# Patient Record
Sex: MALE | Race: OTHER | Employment: OTHER | URBAN - METROPOLITAN AREA
[De-identification: names, ages, dates, MRNs, and addresses within clinical notes are randomized per-mention and may not be internally consistent; named-entity substitution may affect disease eponyms.]

---

## 2017-01-09 ENCOUNTER — TELEPHONE (OUTPATIENT)
Dept: NEPHROLOGY | Facility: CLINIC | Age: 32
End: 2017-01-09

## 2018-05-25 ENCOUNTER — EXTERNAL LAB (OUTPATIENT)
Dept: OTHER | Age: 33
End: 2018-05-25

## 2018-05-25 LAB — LAB RESULT: NORMAL

## 2018-05-26 ENCOUNTER — APPOINTMENT (OUTPATIENT)
Dept: GENERAL RADIOLOGY | Facility: HOSPITAL | Age: 33
DRG: 674 | End: 2018-05-26
Attending: EMERGENCY MEDICINE

## 2018-05-26 ENCOUNTER — APPOINTMENT (OUTPATIENT)
Dept: GENERAL RADIOLOGY | Facility: HOSPITAL | Age: 33
DRG: 674 | End: 2018-05-26
Attending: RADIOLOGY

## 2018-05-26 ENCOUNTER — APPOINTMENT (OUTPATIENT)
Dept: INTERVENTIONAL RADIOLOGY/VASCULAR | Facility: HOSPITAL | Age: 33
DRG: 674 | End: 2018-05-26
Attending: INTERNAL MEDICINE

## 2018-05-26 ENCOUNTER — HOSPITAL ENCOUNTER (INPATIENT)
Facility: HOSPITAL | Age: 33
LOS: 4 days | Discharge: HOME | DRG: 674 | End: 2018-05-30
Attending: EMERGENCY MEDICINE | Admitting: HOSPITALIST

## 2018-05-26 ENCOUNTER — APPOINTMENT (OUTPATIENT)
Dept: ULTRASOUND IMAGING | Facility: HOSPITAL | Age: 33
DRG: 674 | End: 2018-05-26
Attending: INTERNAL MEDICINE

## 2018-05-26 DIAGNOSIS — N18.5 ACUTE RENAL FAILURE SUPERIMPOSED ON STAGE 5 CHRONIC KIDNEY DISEASE, NOT ON CHRONIC DIALYSIS, UNSPECIFIED ACUTE RENAL FAILURE TYPE (HCC): Primary | ICD-10-CM

## 2018-05-26 DIAGNOSIS — D64.9 ANEMIA: ICD-10-CM

## 2018-05-26 DIAGNOSIS — D64.9 ANEMIA, UNSPECIFIED TYPE: ICD-10-CM

## 2018-05-26 DIAGNOSIS — K62.5 RECTAL BLEEDING: ICD-10-CM

## 2018-05-26 DIAGNOSIS — K92.1 HEMATOCHEZIA: ICD-10-CM

## 2018-05-26 DIAGNOSIS — N17.9 ACUTE RENAL FAILURE SUPERIMPOSED ON STAGE 5 CHRONIC KIDNEY DISEASE, NOT ON CHRONIC DIALYSIS, UNSPECIFIED ACUTE RENAL FAILURE TYPE (HCC): Primary | ICD-10-CM

## 2018-05-26 DIAGNOSIS — R11.0 NAUSEA: ICD-10-CM

## 2018-05-26 PROBLEM — E87.1 HYPONATREMIA: Status: ACTIVE | Noted: 2018-05-26

## 2018-05-26 PROBLEM — E87.2 METABOLIC ACIDOSIS: Status: ACTIVE | Noted: 2018-05-26

## 2018-05-26 PROCEDURE — 02HV33Z INSERTION OF INFUSION DEVICE INTO SUPERIOR VENA CAVA, PERCUTANEOUS APPROACH: ICD-10-PCS | Performed by: RADIOLOGY

## 2018-05-26 PROCEDURE — 71045 X-RAY EXAM CHEST 1 VIEW: CPT | Performed by: EMERGENCY MEDICINE

## 2018-05-26 PROCEDURE — B543ZZA ULTRASONOGRAPHY OF RIGHT JUGULAR VEINS, GUIDANCE: ICD-10-PCS | Performed by: RADIOLOGY

## 2018-05-26 PROCEDURE — 5A1D70Z PERFORMANCE OF URINARY FILTRATION, INTERMITTENT, LESS THAN 6 HOURS PER DAY: ICD-10-PCS | Performed by: INTERNAL MEDICINE

## 2018-05-26 PROCEDURE — 30233N1 TRANSFUSION OF NONAUTOLOGOUS RED BLOOD CELLS INTO PERIPHERAL VEIN, PERCUTANEOUS APPROACH: ICD-10-PCS | Performed by: HOSPITALIST

## 2018-05-26 PROCEDURE — 71045 X-RAY EXAM CHEST 1 VIEW: CPT | Performed by: RADIOLOGY

## 2018-05-26 PROCEDURE — 99223 1ST HOSP IP/OBS HIGH 75: CPT | Performed by: INTERNAL MEDICINE

## 2018-05-26 PROCEDURE — 76770 US EXAM ABDO BACK WALL COMP: CPT | Performed by: INTERNAL MEDICINE

## 2018-05-26 PROCEDURE — 99223 1ST HOSP IP/OBS HIGH 75: CPT | Performed by: HOSPITALIST

## 2018-05-26 RX ORDER — ACETAMINOPHEN 325 MG/1
650 TABLET ORAL EVERY 6 HOURS PRN
Status: DISCONTINUED | OUTPATIENT
Start: 2018-05-26 | End: 2018-05-30

## 2018-05-26 RX ORDER — HEPARIN SODIUM 5000 [USP'U]/ML
INJECTION, SOLUTION INTRAVENOUS; SUBCUTANEOUS
Status: COMPLETED
Start: 2018-05-26 | End: 2018-05-26

## 2018-05-26 RX ORDER — METOCLOPRAMIDE HYDROCHLORIDE 5 MG/ML
5 INJECTION INTRAMUSCULAR; INTRAVENOUS EVERY 8 HOURS PRN
Status: DISCONTINUED | OUTPATIENT
Start: 2018-05-26 | End: 2018-05-30

## 2018-05-26 RX ORDER — LIDOCAINE HYDROCHLORIDE 10 MG/ML
INJECTION, SOLUTION EPIDURAL; INFILTRATION; INTRACAUDAL; PERINEURAL
Status: COMPLETED
Start: 2018-05-26 | End: 2018-05-26

## 2018-05-26 RX ORDER — ONDANSETRON 2 MG/ML
4 INJECTION INTRAMUSCULAR; INTRAVENOUS EVERY 6 HOURS PRN
Status: DISCONTINUED | OUTPATIENT
Start: 2018-05-26 | End: 2018-05-30

## 2018-05-26 RX ORDER — AMLODIPINE BESYLATE 5 MG/1
5 TABLET ORAL DAILY
Status: DISCONTINUED | OUTPATIENT
Start: 2018-05-26 | End: 2018-05-30

## 2018-05-26 NOTE — CONSULTS
[facility]  Report of Consultation    Stephanie Staggers Patient Status:  Emergency    4/15/1985 MRN [id number]   Location [address] Attending Shane Powell MD   [facility] Day # 0 PCP Deangelo Cabrajal.  Riley Hinds MD     Reason fo conversational dyspnea  HEENT: Moist mucous membranes. EOM-I. PERRL  Neck: No lymphadenopathy. No JVD. No carotid bruits. Respiratory: Clear to auscultation bilaterally. No wheezes. No rhonchi. Cardiovascular: S1, S2.  Regular rate and rhythm.   No murm transplantation consideration  2. Symptomatic anemia- currently being transfused; monitor  - will check iron stores; AVRIL w/ HD  - check stool for occult blood; no evidence of hemolysis  3. Hyponatremia  4.  Severe acidosis related to untreated #1 - correct

## 2018-05-26 NOTE — PROCEDURES
[facility]  Pre-Procedure Note    Name: Brayan Mao  MRN#: [id number]  : 4/15/1985    Procedure:  Ultrasound and Fluoro Temporary Dialysis Catheter Placement    Indication: Acute Kidney Injury  Chronic Kidney Disease requiring HD    Allerg

## 2018-05-26 NOTE — ED INITIAL ASSESSMENT (HPI)
Pt presents to ED with complaint of low hemoglobin. Pt reports he has had rectal bleeding x2 months. Reports he was seen by his doctor this week and had labs drawn yesterday.  Reports he received call this morning instructing him to come to ED for low hemog

## 2018-05-26 NOTE — PROGRESS NOTES
Pharmacy Note: Renal dose adjustment for Metaclopramide (Reglan)  Rosario Gloss has been prescribed Metoclopramide (Reglan) 10 mg every 8 hours as needed. Estimated Creatinine Clearance: 4.7 mL/min (A) (based on SCr of 20.8 mg/dL (H)).     His ca

## 2018-05-26 NOTE — ED NOTES
Consent for blood transfusion attached to chart. Family at bedside to interpret. All questions answered pt and family updated on plan.

## 2018-05-26 NOTE — CONSULTS
Gastroenterology Initial Consultation  I have personally seen and examined the patient.     Patient Name: Brinda Person  Referring physician: Dr. Betty Thompson  Reason for consultation: Rectal bleeding, anemia  CC: Rectal bleeding, anemia  HPI: This is history of CAD, prior MI, chest pain, or palpitations            Respiratory: No shortness of breath, asthma, copd, recurrent pneumonia            Hematologic: The patient reports no easy bruising, frequent gum bleeding or nose bleeding;   The patient has n patient request.    Labs:   Lab Results  Component Value Date   WBC 7.3 05/26/2018   HGB 5.9 05/26/2018   HCT 16.8 05/26/2018   .0 05/26/2018   CREATSERUM 20.80 05/26/2018    05/26/2018    05/26/2018   K 4.1 05/26/2018   CL 90 05/26/201 adequately stabilized. He does warrant colonoscopy for this evaluation. We will also check pre-transfusion iron levels. If iron deficient, then he would also warrant EGD with gastric / duodenal biopsies.

## 2018-05-26 NOTE — ED PROVIDER NOTES
Patient Seen in: [facility] Emergency Department    History   Patient presents with:  Abnormal Result (metabolic, cardiac)    Stated Complaint:     HPI    Patient presents with a low hemoglobin. The patient has had rectal bleeding for months.   It w light, oropharynx clear, uvula midline. Neck: Supple. Cardiovascular: Regular rate and rhythm, no murmurs. Respiratory: Lungs clear to auscultation. Abdomen: Soft, nontender, no rebound or guarding, normal active bowel sounds, no CVA tenderness.   Recta WITH DIFFERENTIAL WITH PLATELET.   Procedure                               Abnormality         Status                     ---------                               -----------         ------                     CBC W/ DIFFERENTIAL[[id number]]          Abnormal consulted and came to see him in the emergency department. He does not appear to be actively bleeding at this time. Dr. Barbara Hi from nephrology was consulted when his chemistries came back.   He came to the emergency department to discuss with the patient

## 2018-05-26 NOTE — PLAN OF CARE
Problem: Patient/Family Goals  Goal: Patient/Family Short Term Goal  Patient's Short Term Goal: 5/26-resolve acute issues    Interventions:   - 5/26-stat dialysis  - See additional Care Plan goals for specific interventions   Outcome: Progressing

## 2018-05-26 NOTE — H&P
[facility]  History and Physical     Stephanie Staggers Patient Status:  Emergency    4/15/1985 MRN [id number]   Location [address] Attending Shane Powell MD   [facility] Day # 0 CORTNEY Carbajal.  Riley Hinds MD     Chief distress. Alert and oriented x 3. HEENT: Normocephalic atraumatic. Moist mucous membranes. EOM-I. PERRLA. Anicteric. Neck: No lymphadenopathy. No JVD. No carotid bruits. Respiratory: Clear to auscultation bilaterally. No wheezes. No rhonchi.   Cardiovasc MD  5/26/2018

## 2018-05-26 NOTE — CM/SW NOTE
Received order to assist with new dialysis. Met with pt and wife and several other family members- sister in law interpreted since pt and wife are [place] speaking.   Pt used to have [facility] and was told he was being switched to [place] but th

## 2018-05-26 NOTE — PROCEDURES
[address] [facility] Patient Status:  Emergency    4/15/1985 MRN [id number]   Location [facility] Attending Clif Kincaid MD   [facility] Day # 0 PCP Kush Kearney.  Dami Ayers MD         Brief Procedure Report    Pr

## 2018-05-26 NOTE — PLAN OF CARE
Problem: Patient/Family Goals  Goal: Patient/Family Long Term Goal  Patient's Long Term Goal: 5/26-resolve acute issues    Interventions:  - 5/26-stat dialysis  - See additional Care Plan goals for specific interventions   Outcome: Progressing

## 2018-05-26 NOTE — PROGRESS NOTES
s/p dialysis. tolerated.hgb is 6.7.1 more Units of prbc.verified with hospitalist and nephrologist.order carried out. will endorse to next RN

## 2018-05-27 PROCEDURE — 90935 HEMODIALYSIS ONE EVALUATION: CPT | Performed by: INTERNAL MEDICINE

## 2018-05-27 PROCEDURE — 99232 SBSQ HOSP IP/OBS MODERATE 35: CPT | Performed by: HOSPITALIST

## 2018-05-27 RX ORDER — CALCIUM CARBONATE 200(500)MG
1000 TABLET,CHEWABLE ORAL
Status: DISCONTINUED | OUTPATIENT
Start: 2018-05-27 | End: 2018-05-30

## 2018-05-27 RX ORDER — LISINOPRIL 20 MG/1
20 TABLET ORAL DAILY
Status: DISCONTINUED | OUTPATIENT
Start: 2018-05-27 | End: 2018-05-29

## 2018-05-27 NOTE — PLAN OF CARE
Problem: Patient/Family Goals  Goal: Patient/Family Short Term Goal  Patient's Short Term Goal: 5/26-resolve acute issues  5/26 nocs: no more bleeding from rectum  5/27-resolve anemia  Interventions:   5/27-monitor hgb  - 5/26-stat dialysis  - See addition

## 2018-05-27 NOTE — PROGRESS NOTES
[facility]  Progress Note     Lamonte Ellis Patient Status:  Inpatient    4/15/1985 MRN [id number]   [facility] 5NW-A Attending Waqas Stephenson MD   [facility] Day # 1 PCP Mary Stinson MD     Chief Complaint: GIB    S: Patient units  3. GIB:  Eventual colonoscopy per GI  4. Hyponatremia:  Improved  5. Metabolic Acidosis:  Improved  6.  Mild hypokalemia:  Acceptable with renal function    Plan of care: As above    Quality:  · DVT Prophylaxis: SCD's  · CODE status: Full Code  · Lamin Guzman

## 2018-05-27 NOTE — PROGRESS NOTES
Gastroenterology Progress Note  Patient Name: Juancarlos De La Garza  Chief Complaint: Hematochezia, anemia  S: The patient reports only a little bright red blood per rectum this morning. He has had nausea today, but better overall, since HD yesterday.   No

## 2018-05-27 NOTE — PROGRESS NOTES
[facility]  Progress Note    Lamonte Ellis Patient Status:  Emergency    4/15/1985 MRN [id number]   Location [address] [facility] [address] Attending Servando Sahu MD   [facility] Day # 1 PCP Mary Stinson MD     No acute issues  S clearly uremic with associated lab anormalities  Feels much better after transfusion/HD  - 2nd HD today  - exchange temporary HD cath to [facility] tomorrow if possible  - pt will be referred to transplant center for transplantation consideration  - social

## 2018-05-28 ENCOUNTER — ANESTHESIA EVENT (OUTPATIENT)
Dept: ENDOSCOPY | Facility: HOSPITAL | Age: 33
DRG: 674 | End: 2018-05-28

## 2018-05-28 PROCEDURE — 99232 SBSQ HOSP IP/OBS MODERATE 35: CPT | Performed by: INTERNAL MEDICINE

## 2018-05-28 PROCEDURE — 99232 SBSQ HOSP IP/OBS MODERATE 35: CPT | Performed by: HOSPITALIST

## 2018-05-28 NOTE — PROGRESS NOTES
[facility]  Progress Note     Nettie Ruby Patient Status:  Inpatient    4/15/1985 MRN [id number]   [facility] 5NW-A Attending Jennifer Plummer MD   [address] Day # 2 PCP Alecia Pittman.  Mary Izquierdo MD     Chief Complaint: GIB    S: Patient Epic.    Medications:   • polyethylene glycol  4,000 mL Oral Once   • Calcium Carbonate Antacid  1,000 mg Oral TID AC   • lisinopril  20 mg Oral Daily   • AmLODIPine Besylate  5 mg Oral Daily       ASSESSMENT / PLAN:     1.  ESRD on HD:  Await outpatient HD

## 2018-05-28 NOTE — ANESTHESIA PREPROCEDURE EVALUATION
PRE-OP EVALUATION    Patient Name: Gerardo Temple    Pre-op Diagnosis: Hematochezia [K92.1]  Nausea [R11.0]  Anemia [D64.9]    Procedure(s):  Esophagogastroduodenoscopy, Colonoscopy      Surgeon(s) and Role:     * Carter Lee, DO - Primary    P Results  Component Value Date   WBC 7.0 05/28/2018   RBC 2.65 (L) 05/28/2018   HGB 7.9 (L) 05/28/2018   HCT 23.2 (L) 05/28/2018   MCV 87.5 05/28/2018   MCH 29.8 05/28/2018   MCHC 34.1 05/28/2018   RDW 12.6 05/28/2018   .0 05/28/2018       Lab Result

## 2018-05-28 NOTE — PROGRESS NOTES
[facility]  Progress Note    Kvng Mckay Patient Status:  Emergency    4/15/1985 MRN [id number]   Location [address] Attending Edouard Jackson MD   [facility] Day [facility] PCP Glenny Groves.  Ye Reese MD     No acute issues 13.10* 8.78*   CA 5.6* 6.2* 6.8*   MG  --  2.0 2.1         Recent Labs   05/26/18  0659   ALT 18   AST 5*   ALB 3.3*         Imaging:  Reviewed    Impression:  1.  ESRD - progressive disease; unclear etiology - suspect HTN; possible underlying GN not ruled

## 2018-05-28 NOTE — PROGRESS NOTES
Gastroenterology Progress Note  Patient Name: Lilia Lea  Chief Complaint: Hematochezia, anemia, nausea  S: The patient repots no further hematochezia since yesterday. He has continued mild nausea, which is improving.   Overall, since having sta

## 2018-05-29 ENCOUNTER — ANESTHESIA (OUTPATIENT)
Dept: ENDOSCOPY | Facility: HOSPITAL | Age: 33
DRG: 674 | End: 2018-05-29

## 2018-05-29 ENCOUNTER — APPOINTMENT (OUTPATIENT)
Dept: MRI IMAGING | Facility: HOSPITAL | Age: 33
DRG: 674 | End: 2018-05-29
Attending: NURSE PRACTITIONER

## 2018-05-29 ENCOUNTER — SURGERY (OUTPATIENT)
Age: 33
End: 2018-05-29

## 2018-05-29 PROCEDURE — 99232 SBSQ HOSP IP/OBS MODERATE 35: CPT | Performed by: HOSPITALIST

## 2018-05-29 PROCEDURE — 0DBK8ZX EXCISION OF ASCENDING COLON, VIA NATURAL OR ARTIFICIAL OPENING ENDOSCOPIC, DIAGNOSTIC: ICD-10-PCS | Performed by: INTERNAL MEDICINE

## 2018-05-29 PROCEDURE — 74183 MRI ABD W/O CNTR FLWD CNTR: CPT | Performed by: NURSE PRACTITIONER

## 2018-05-29 PROCEDURE — 0DB68ZX EXCISION OF STOMACH, VIA NATURAL OR ARTIFICIAL OPENING ENDOSCOPIC, DIAGNOSTIC: ICD-10-PCS | Performed by: INTERNAL MEDICINE

## 2018-05-29 PROCEDURE — 90935 HEMODIALYSIS ONE EVALUATION: CPT | Performed by: INTERNAL MEDICINE

## 2018-05-29 PROCEDURE — 0DB98ZX EXCISION OF DUODENUM, VIA NATURAL OR ARTIFICIAL OPENING ENDOSCOPIC, DIAGNOSTIC: ICD-10-PCS | Performed by: INTERNAL MEDICINE

## 2018-05-29 PROCEDURE — 72197 MRI PELVIS W/O & W/DYE: CPT | Performed by: NURSE PRACTITIONER

## 2018-05-29 RX ORDER — LISINOPRIL 40 MG/1
40 TABLET ORAL DAILY
Status: DISCONTINUED | OUTPATIENT
Start: 2018-05-29 | End: 2018-05-30

## 2018-05-29 NOTE — PROGRESS NOTES
[facility]  Progress Note     Rhinathong Stuart Patient Status:  Inpatient    4/15/1985 MRN [id number]   [facility] 5NW-A Attending Shira Brewer MD   [facility] Day # 3 PCP Bryan Marlow.  Socrates Coleman MD     Chief Complaint: GIB    S: Patient 0659   PTP  16.5*   INR  1.28*       No results for input(s): TROP, CK in the last 168 hours. Imaging: Imaging data reviewed in Epic.     Medications:   • lisinopril  40 mg Oral Daily   • epoetin estela  10,000 Units Intravenous Once in dialysis   • C

## 2018-05-29 NOTE — PROGRESS NOTES
[facility]  Progress Note    Rosalinda Garnica Patient Status:  Emergency    4/15/1985 MRN [id number]   Location [address] Attending Rae Solano MD   [address] Day # 3 PCP Kranthi Felix.  Doreen Dennis MD     Doing OK  Down for CREATSERUM 13.10* 8.78* 10.90*   CA 6.2* 6.8* 7.2*   MG 2.0 2.1  --         Imaging:  Reviewed    Impression:  1. ESRD - progressive disease; unclear etiology - suspect HTN; possible underlying GN not ruled out.  He is clearly uremic with associated lab a

## 2018-05-29 NOTE — PROGRESS NOTES
[facility] [address], [address]  ? 05/29/18  ? Re: Stephanie Watson  ? To Whom It May Concern:    Stephanie Watson was admitted to [facility] from 5/26/2018 to [facility].     Please excuse Kristen Banerjee

## 2018-05-29 NOTE — ANESTHESIA POSTPROCEDURE EVALUATION
[facility] Patient Status:  Inpatient   Age/Gender 28year old male MRN [id number]   Location [address] [facility]. Attending Ciera Hunter, [facility] Day # 3 PCP Malik Maldonado.  Hayden Martinez MD       Anesthesia Post-op Note    Proced

## 2018-05-29 NOTE — PAYOR COMM NOTE
--------------  ADMISSION REVIEW     Payor: N/A  Subscriber #:  [id number]  Authorization Number: N/A    Admit date: 5/26/18  Admit time: 36       Admitting Physician: Quyen Frank MD  Attending Physician:  Quyen Frank MD  Primary Care Physician: [facility] CULTURE REFLEX - Abnormal; Notable for the following:     Protein Urine 100  (*)     Bacteria Urine Rare (*)     All other components within normal limits   PROTHROMBIN TIME (PT) - Abnormal; Notable for the following:     PT 16.5 (*)     INR 1.28 (*)     A result                 Please view results for these tests on the individual orders.    ABORH (BLOOD TYPE)   ANTIBODY SCREEN   PREPARE RBC   RAINBOW DRAW BLUE   RAINBOW DRAW LAVENDER   RAINBOW DRAW LIGHT GREEN   RAINBOW DRAW GOLD   OCCULT BLOOD, STOOL     X 3) From a GI standpoint, his GI evaluation can be pursued in the outpatient setting. This may be challenging, as he does not have insurance.   However, even an inpatient GI evaluation will need to be deferred until he has had appropriate renal inter

## 2018-05-29 NOTE — PLAN OF CARE
Pt reports multiple BMs through night. Most recent at 0445 was watery and almost clear; slightly yellow. Pt finished golytely and has been NPO since midnight. Will continue to monitor.

## 2018-05-29 NOTE — OPERATIVE REPORT
Operative Report-Colonoscopy with cold biopsies    Marsha Sarah 4/15/1985   CSN [id number] MRN [id number]   Admission Date 5/26/2018 Operation Date 5/29/2018   Attending Physician Ebony Marshall MD Operating Physician Sandro Vang DO     PREOP colonoscopy in 5 years if the polyp is an adenoma. - Sitz bath daily.    - Consider capsule endoscopy as an outpatient vs MRE      CC Report:   Zuleima Mccabe  5/29/2018  11:59 AM

## 2018-05-29 NOTE — OPERATIVE REPORT
Operative Report-Esophagogastroduodenoscopy with cold biopsies  Juancarlos De La Garza 4/15/1985   [facility] [id number] MRN [id number]   Admission Date 5/26/2018 Operation Date 5/29/2018   Attending Physician Randall Aschoff, MD Operating Physician Lety Mcwilliams,

## 2018-05-30 ENCOUNTER — APPOINTMENT (OUTPATIENT)
Dept: INTERVENTIONAL RADIOLOGY/VASCULAR | Facility: HOSPITAL | Age: 33
DRG: 674 | End: 2018-05-30
Attending: INTERNAL MEDICINE

## 2018-05-30 VITALS
RESPIRATION RATE: 20 BRPM | WEIGHT: 179.63 LBS | SYSTOLIC BLOOD PRESSURE: 143 MMHG | HEART RATE: 78 BPM | DIASTOLIC BLOOD PRESSURE: 86 MMHG | BODY MASS INDEX: 28.19 KG/M2 | HEIGHT: 67 IN | OXYGEN SATURATION: 95 % | TEMPERATURE: 99 F

## 2018-05-30 PROCEDURE — 02PYX3Z REMOVAL OF INFUSION DEVICE FROM GREAT VESSEL, EXTERNAL APPROACH: ICD-10-PCS | Performed by: RADIOLOGY

## 2018-05-30 PROCEDURE — B518ZZA FLUOROSCOPY OF SUPERIOR VENA CAVA, GUIDANCE: ICD-10-PCS | Performed by: RADIOLOGY

## 2018-05-30 PROCEDURE — 99239 HOSP IP/OBS DSCHRG MGMT >30: CPT | Performed by: HOSPITALIST

## 2018-05-30 PROCEDURE — 0JH63XZ INSERTION OF TUNNELED VASCULAR ACCESS DEVICE INTO CHEST SUBCUTANEOUS TISSUE AND FASCIA, PERCUTANEOUS APPROACH: ICD-10-PCS | Performed by: RADIOLOGY

## 2018-05-30 PROCEDURE — 99233 SBSQ HOSP IP/OBS HIGH 50: CPT | Performed by: INTERNAL MEDICINE

## 2018-05-30 PROCEDURE — 02HV33Z INSERTION OF INFUSION DEVICE INTO SUPERIOR VENA CAVA, PERCUTANEOUS APPROACH: ICD-10-PCS | Performed by: RADIOLOGY

## 2018-05-30 RX ORDER — NALOXONE HYDROCHLORIDE 0.4 MG/ML
80 INJECTION, SOLUTION INTRAMUSCULAR; INTRAVENOUS; SUBCUTANEOUS AS NEEDED
Status: ACTIVE | OUTPATIENT
Start: 2018-05-30 | End: 2018-05-30

## 2018-05-30 RX ORDER — CEFAZOLIN SODIUM 1 G/3ML
INJECTION, POWDER, FOR SOLUTION INTRAMUSCULAR; INTRAVENOUS
Status: COMPLETED
Start: 2018-05-30 | End: 2018-05-30

## 2018-05-30 RX ORDER — MIDAZOLAM HYDROCHLORIDE 1 MG/ML
INJECTION INTRAMUSCULAR; INTRAVENOUS
Status: COMPLETED
Start: 2018-05-30 | End: 2018-05-30

## 2018-05-30 RX ORDER — HEPARIN SODIUM 5000 [USP'U]/ML
INJECTION, SOLUTION INTRAVENOUS; SUBCUTANEOUS
Status: COMPLETED
Start: 2018-05-30 | End: 2018-05-30

## 2018-05-30 RX ORDER — LISINOPRIL 40 MG/1
40 TABLET ORAL DAILY
Qty: 30 TABLET | Refills: 0 | Status: SHIPPED | OUTPATIENT
Start: 2018-05-30 | End: 2018-06-29

## 2018-05-30 RX ORDER — SODIUM CHLORIDE, SODIUM LACTATE, POTASSIUM CHLORIDE, CALCIUM CHLORIDE 600; 310; 30; 20 MG/100ML; MG/100ML; MG/100ML; MG/100ML
INJECTION, SOLUTION INTRAVENOUS CONTINUOUS
Status: DISCONTINUED | OUTPATIENT
Start: 2018-05-30 | End: 2018-05-30

## 2018-05-30 RX ORDER — HEPARIN SODIUM 1000 [USP'U]/ML
1000 INJECTION, SOLUTION INTRAVENOUS; SUBCUTANEOUS
Status: DISCONTINUED | OUTPATIENT
Start: 2018-05-30 | End: 2018-05-30

## 2018-05-30 RX ORDER — LIDOCAINE HYDROCHLORIDE AND EPINEPHRINE 15; 5 MG/ML; UG/ML
INJECTION, SOLUTION EPIDURAL
Status: COMPLETED
Start: 2018-05-30 | End: 2018-05-30

## 2018-05-30 RX ORDER — LIDOCAINE HYDROCHLORIDE 10 MG/ML
INJECTION, SOLUTION EPIDURAL; INFILTRATION; INTRACAUDAL; PERINEURAL
Status: COMPLETED
Start: 2018-05-30 | End: 2018-05-30

## 2018-05-30 RX ORDER — HEPARIN SODIUM 5000 [USP'U]/ML
INJECTION, SOLUTION INTRAVENOUS; SUBCUTANEOUS
Status: DISPENSED
Start: 2018-05-30 | End: 2018-05-30

## 2018-05-30 RX ORDER — HEPARIN SODIUM 1000 [USP'U]/ML
2000 INJECTION, SOLUTION INTRAVENOUS; SUBCUTANEOUS ONCE
Status: COMPLETED | OUTPATIENT
Start: 2018-05-30 | End: 2018-05-30

## 2018-05-30 NOTE — PLAN OF CARE
PAIN - ADULT    • Verbalizes/displays adequate comfort level or patient's stated pain goal Progressing          PROBLEM: Renal failure    ASSESSMENT: Pt is A&O x4. Tristanian-speaking only; using  iPad. On RA, denies SOB.  Tolerating diet, denies N/

## 2018-05-30 NOTE — PROGRESS NOTES
[facility]  Nephrology Progress Note    India Tucker Attending:  Danelle Rodriguez MD       Assessment and Plan:    1) ESRD- likely due to primary glomerular disease exacerbated by uncontrolled HTN; now clearly endstage. Tolerated HD well.  PLAN- Heparin Sodium (Porcine) 5000 UNIT/ML injection      heparin sodium 1000 UNIT/ML injection 1,000 Units 1,000 Units Intravenous PRN Dialysis   lisinopril (PRINIVIL,ZESTRIL) tab 40 mg 40 mg Oral Daily   Hydrocortisone Acetate (ANUSOL-HC) suppository 25 mg

## 2018-05-30 NOTE — CM/SW NOTE
Spoke with Meghan Rodriguez with [facility] Intake ([phone]) who confirmed referral is pending to [address] [place] [address] on [address]. Discussed pt's Medicaid pending status and she stated this will not be an issue due to ESRD diagnosis.       Spoke with Daniella Brady fr

## 2018-05-30 NOTE — PROGRESS NOTES
[facility]  Progress Note     Juancarlos De La Garza Patient Status:  Inpatient    4/15/1985 MRN [id number]   [facility] 5NW-A Attending Randall Aschoff, MD   [facility] Day # 4 PCP Zelda Chacko.  Ever Manley MD     Chief Complaint: GIB    S: Patient PTP  16.5*   INR  1.28*       No results for input(s): TROP, CK in the last 168 hours. Imaging: Imaging data reviewed in Epic.     Medications:   • Heparin Sodium (Porcine)       • lisinopril  40 mg Oral Daily   • Hydrocortisone Acetate  25 mg Rec

## 2018-05-30 NOTE — PLAN OF CARE
Pt dialysis complete. VSS. 3L taken out. Renal nurse requested 2200 heparin total.  Pt updated on plan of care.

## 2018-05-31 NOTE — CM/SW NOTE
Patient discharged on 5/30/18 as previously planned.        05/31/18 0800   Discharge disposition   Expected discharge disposition Home or Self   Name of [address] [facility]   Outpatient services Dialysis   Home services after discharge None

## 2018-05-31 NOTE — DISCHARGE SUMMARY
[facility]  DISCHARGE SUMMARY     Lissa Brush Patient Status:  Inpatient    4/15/1985 MRN [id number]   [facility] 5NW-A Attending No att. providers found   [address] Day # 4 PCP Pipe Coto.  Kandice An MD     Date of Admission:  daily.   Stop taking on:  6/12/2018  Quantity:  28 suppository  Refills:  0     lisinopril 40 MG Tabs  Commonly known as:  PRINIVIL,ZESTRIL      Take 1 tablet (40 mg total) by mouth daily.    Stop taking on:  6/29/2018  Quantity:  30 tablet  Refills:  0 MD 5/31/2018    Time spent:  > 30 minutes

## 2018-06-06 ENCOUNTER — CHARTING TRANS (OUTPATIENT)
Dept: OTHER | Age: 33
End: 2018-06-06

## 2018-06-25 ENCOUNTER — CHARTING TRANS (OUTPATIENT)
Dept: OTHER | Age: 33
End: 2018-06-25

## 2018-08-03 ENCOUNTER — CHARTING TRANS (OUTPATIENT)
Dept: OTHER | Age: 33
End: 2018-08-03

## 2018-10-25 NOTE — CM/SW NOTE
SW updated clinical, including dialysis flow sheets and Hep B results via [facility] to [facility]. Financial status pending [employer] assessment. SW will continue to follow for anticipated new HD arrangements. 0

## 2018-12-01 ENCOUNTER — IMAGING SERVICES (OUTPATIENT)
Dept: OTHER | Age: 33
End: 2018-12-01

## 2018-12-01 PROCEDURE — 90970 ESRD SVC PR DAY PT 20+: CPT | Performed by: INTERNAL MEDICINE

## 2018-12-02 PROCEDURE — 90970 ESRD SVC PR DAY PT 20+: CPT | Performed by: INTERNAL MEDICINE

## 2018-12-03 PROCEDURE — 90970 ESRD SVC PR DAY PT 20+: CPT | Performed by: INTERNAL MEDICINE

## 2018-12-04 PROCEDURE — 90970 ESRD SVC PR DAY PT 20+: CPT | Performed by: INTERNAL MEDICINE

## 2018-12-05 PROCEDURE — 90970 ESRD SVC PR DAY PT 20+: CPT | Performed by: INTERNAL MEDICINE

## 2018-12-06 PROCEDURE — 90970 ESRD SVC PR DAY PT 20+: CPT | Performed by: INTERNAL MEDICINE

## 2018-12-07 PROCEDURE — 90970 ESRD SVC PR DAY PT 20+: CPT | Performed by: INTERNAL MEDICINE

## 2018-12-08 PROCEDURE — 90970 ESRD SVC PR DAY PT 20+: CPT | Performed by: INTERNAL MEDICINE

## 2018-12-09 PROCEDURE — 90970 ESRD SVC PR DAY PT 20+: CPT | Performed by: INTERNAL MEDICINE

## 2018-12-10 PROCEDURE — 90970 ESRD SVC PR DAY PT 20+: CPT | Performed by: INTERNAL MEDICINE

## 2018-12-11 PROCEDURE — 90970 ESRD SVC PR DAY PT 20+: CPT | Performed by: INTERNAL MEDICINE

## 2018-12-12 PROCEDURE — 90970 ESRD SVC PR DAY PT 20+: CPT | Performed by: INTERNAL MEDICINE

## 2018-12-13 PROCEDURE — 90970 ESRD SVC PR DAY PT 20+: CPT | Performed by: INTERNAL MEDICINE

## 2018-12-14 PROCEDURE — 90970 ESRD SVC PR DAY PT 20+: CPT | Performed by: INTERNAL MEDICINE

## 2019-01-11 ENCOUNTER — OFFICE VISIT (OUTPATIENT)
Dept: SURGERY | Age: 34
End: 2019-01-11

## 2019-01-11 VITALS — HEIGHT: 68 IN | TEMPERATURE: 99.2 F | WEIGHT: 178.57 LBS | BODY MASS INDEX: 27.06 KG/M2

## 2019-01-11 DIAGNOSIS — K64.1 GRADE II HEMORRHOIDS: Primary | ICD-10-CM

## 2019-01-11 PROCEDURE — 46600 DIAGNOSTIC ANOSCOPY SPX: CPT | Performed by: SURGERY

## 2019-01-11 PROCEDURE — 99202 OFFICE O/P NEW SF 15 MIN: CPT | Performed by: SURGERY

## 2019-01-11 RX ORDER — ALLOPURINOL 100 MG/1
100 TABLET ORAL
COMMUNITY
Start: 2016-11-11 | End: 2023-03-13 | Stop reason: ALTCHOICE

## 2019-01-11 RX ORDER — AMLODIPINE BESYLATE 10 MG/1
TABLET ORAL
COMMUNITY
Start: 2018-08-01

## 2019-01-11 RX ORDER — LISINOPRIL 5 MG/1
TABLET ORAL
COMMUNITY
Start: 2018-08-01 | End: 2023-02-08 | Stop reason: ALTCHOICE

## 2019-01-11 SDOH — HEALTH STABILITY: MENTAL HEALTH: HOW OFTEN DO YOU HAVE A DRINK CONTAINING ALCOHOL?: NEVER

## 2019-02-08 ENCOUNTER — OFFICE VISIT (OUTPATIENT)
Dept: SURGERY | Age: 34
End: 2019-02-08

## 2019-02-08 VITALS — WEIGHT: 178 LBS | TEMPERATURE: 98.8 F | HEIGHT: 68 IN | BODY MASS INDEX: 26.98 KG/M2

## 2019-02-08 DIAGNOSIS — K64.1 GRADE II HEMORRHOIDS: Primary | ICD-10-CM

## 2019-02-08 PROCEDURE — 46600 DIAGNOSTIC ANOSCOPY SPX: CPT | Performed by: SURGERY

## 2019-02-08 SDOH — HEALTH STABILITY: MENTAL HEALTH: HOW OFTEN DO YOU HAVE A DRINK CONTAINING ALCOHOL?: NEVER

## 2019-04-19 ENCOUNTER — OFF PREMISE (OUTPATIENT)
Dept: NEPHROLOGY | Age: 34
End: 2019-04-19

## 2019-04-19 DIAGNOSIS — N18.6 END STAGE RENAL DISEASE (CMD): ICD-10-CM

## 2019-06-24 PROBLEM — N18.6 END-STAGE RENAL DISEASE ON HEMODIALYSIS (HCC): Status: ACTIVE | Noted: 2018-08-08

## 2019-06-24 PROBLEM — Z99.2 END-STAGE RENAL DISEASE ON HEMODIALYSIS (HCC): Status: ACTIVE | Noted: 2018-08-08

## 2019-07-16 ENCOUNTER — HOSPITAL ENCOUNTER (OUTPATIENT)
Facility: HOSPITAL | Age: 34
Setting detail: HOSPITAL OUTPATIENT SURGERY
Discharge: HOME | End: 2019-07-16
Attending: INTERNAL MEDICINE | Admitting: INTERNAL MEDICINE
Payer: MEDICARE

## 2019-07-16 ENCOUNTER — ANESTHESIA (OUTPATIENT)
Dept: ENDOSCOPY | Facility: HOSPITAL | Age: 34
End: 2019-07-16

## 2019-07-16 ENCOUNTER — ANESTHESIA EVENT (OUTPATIENT)
Dept: ENDOSCOPY | Facility: HOSPITAL | Age: 34
End: 2019-07-16

## 2019-07-16 VITALS
RESPIRATION RATE: 18 BRPM | HEIGHT: 68 IN | DIASTOLIC BLOOD PRESSURE: 56 MMHG | OXYGEN SATURATION: 100 % | WEIGHT: 183 LBS | TEMPERATURE: 98 F | BODY MASS INDEX: 27.74 KG/M2 | SYSTOLIC BLOOD PRESSURE: 104 MMHG | HEART RATE: 62 BPM

## 2019-07-16 DIAGNOSIS — R12 HEARTBURN: ICD-10-CM

## 2019-07-16 DIAGNOSIS — K62.5 RECTAL BLEEDING: ICD-10-CM

## 2019-07-16 LAB
ANION GAP SERPL CALC-SCNC: 7 MMOL/L (ref 0–18)
BUN BLD-MCNC: 30 MG/DL (ref 7–18)
BUN/CREAT SERPL: 3.7 (ref 10–20)
CALCIUM BLD-MCNC: 8.9 MG/DL (ref 8.5–10.1)
CHLORIDE SERPL-SCNC: 100 MMOL/L (ref 98–112)
CO2 SERPL-SCNC: 33 MMOL/L (ref 21–32)
CREAT BLD-MCNC: 8.16 MG/DL (ref 0.7–1.3)
GLUCOSE BLD-MCNC: 86 MG/DL (ref 70–99)
OSMOLALITY SERPL CALC.SUM OF ELEC: 295 MOSM/KG (ref 275–295)
POTASSIUM SERPL-SCNC: 4.2 MMOL/L (ref 3.5–5.1)
SODIUM SERPL-SCNC: 140 MMOL/L (ref 136–145)

## 2019-07-16 PROCEDURE — 0DBL8ZX EXCISION OF TRANSVERSE COLON, VIA NATURAL OR ARTIFICIAL OPENING ENDOSCOPIC, DIAGNOSTIC: ICD-10-PCS | Performed by: INTERNAL MEDICINE

## 2019-07-16 PROCEDURE — 0DB98ZX EXCISION OF DUODENUM, VIA NATURAL OR ARTIFICIAL OPENING ENDOSCOPIC, DIAGNOSTIC: ICD-10-PCS | Performed by: INTERNAL MEDICINE

## 2019-07-16 PROCEDURE — 0DB68ZX EXCISION OF STOMACH, VIA NATURAL OR ARTIFICIAL OPENING ENDOSCOPIC, DIAGNOSTIC: ICD-10-PCS | Performed by: INTERNAL MEDICINE

## 2019-07-16 PROCEDURE — 80048 BASIC METABOLIC PNL TOTAL CA: CPT

## 2019-07-16 PROCEDURE — 06LY4CC OCCLUSION OF HEMORRHOIDAL PLEXUS WITH EXTRALUMINAL DEVICE, PERCUTANEOUS ENDOSCOPIC APPROACH: ICD-10-PCS | Performed by: INTERNAL MEDICINE

## 2019-07-16 PROCEDURE — 88305 TISSUE EXAM BY PATHOLOGIST: CPT | Performed by: INTERNAL MEDICINE

## 2019-07-16 PROCEDURE — 0DB58ZX EXCISION OF ESOPHAGUS, VIA NATURAL OR ARTIFICIAL OPENING ENDOSCOPIC, DIAGNOSTIC: ICD-10-PCS | Performed by: INTERNAL MEDICINE

## 2019-07-16 RX ORDER — HYDROMORPHONE HYDROCHLORIDE 1 MG/ML
0.4 INJECTION, SOLUTION INTRAMUSCULAR; INTRAVENOUS; SUBCUTANEOUS EVERY 5 MIN PRN
Status: DISCONTINUED | OUTPATIENT
Start: 2019-07-16 | End: 2019-07-16

## 2019-07-16 RX ORDER — SODIUM CHLORIDE, SODIUM LACTATE, POTASSIUM CHLORIDE, CALCIUM CHLORIDE 600; 310; 30; 20 MG/100ML; MG/100ML; MG/100ML; MG/100ML
INJECTION, SOLUTION INTRAVENOUS CONTINUOUS
Status: DISCONTINUED | OUTPATIENT
Start: 2019-07-16 | End: 2019-07-16

## 2019-07-16 RX ORDER — DEXAMETHASONE SODIUM PHOSPHATE 4 MG/ML
4 VIAL (ML) INJECTION AS NEEDED
Status: DISCONTINUED | OUTPATIENT
Start: 2019-07-16 | End: 2019-07-16

## 2019-07-16 RX ORDER — NALOXONE HYDROCHLORIDE 0.4 MG/ML
80 INJECTION, SOLUTION INTRAMUSCULAR; INTRAVENOUS; SUBCUTANEOUS AS NEEDED
Status: DISCONTINUED | OUTPATIENT
Start: 2019-07-16 | End: 2019-07-16

## 2019-07-16 RX ORDER — ONDANSETRON 2 MG/ML
4 INJECTION INTRAMUSCULAR; INTRAVENOUS AS NEEDED
Status: DISCONTINUED | OUTPATIENT
Start: 2019-07-16 | End: 2019-07-16

## 2019-07-16 RX ORDER — SODIUM CHLORIDE 9 MG/ML
INJECTION, SOLUTION INTRAVENOUS CONTINUOUS
Status: DISCONTINUED | OUTPATIENT
Start: 2019-07-16 | End: 2019-07-16

## 2019-07-16 NOTE — ANESTHESIA PREPROCEDURE EVALUATION
PRE-OP EVALUATION    Patient Name: Anu De León    Pre-op Diagnosis: Heartburn [R12]  Rectal bleeding [K62.5]    Procedure(s):  [id number]      Surgeon(s) and Role:     Kanu Harp MD - Primary    Pre-op vitals Plan: MAC  NPO status verified and patient meets guidelines. Patient has not taken beta blockers in last 24 hours. Post-procedure pain management plan discussed with surgeon and patient.       Plan/risks discussed with: patient                Present on

## 2019-07-16 NOTE — OR NURSING
Used  Marlo Bending ID #[id number]. Patient states unable to pass flatus, states \"not really pain'. Instructed to turn side to side to pass flatus also at home walk in the house to assist with passing flatus. Tolerating apple juice at this time.  Abdomen sof

## 2019-07-16 NOTE — OPERATIVE REPORT
Lizzette Lazaro Patient Status:  Hospital Outpatient Surgery    4/15/1985 MRN [id number]   [facility] ENDOSCOPY Attending Juana Jaffe MD   Date 2019 PCP Ivana Madrigal.  MD Brandee     PREOPERATIVE DIAGNOSIS/INDICATION: Hematoc columns, no complications noted, patient tolerated procedure well  RECOMMENDATIONS:   - Post Colonoscopy/polypectomy/hemorrhoidal banding precautions, watch for bleeding, infection, perforation, adverse drug reactions   - Follow biopsies.  - Repeat colonos

## 2019-07-16 NOTE — H&P
[address] [facility] Patient Status:  [facility] Outpatient Surgery    4/15/1985 MRN [id number]   [facility] ENDOSCOPY Attending Dave Sarmiento MD   Date 2019 PCP Lionel Godinez.  Virginia Gray MD     CC: Hemat temperature 97.7 °F (36.5 °C), temperature source Oral, resp. rate 18, height 68\", weight 182 lb 15.7 oz, SpO2 100 %. General: Appears alert, oriented x3 and in no acute distress. HEENT: Normal.  CV: S1 and S2 normal.    Lungs: Clear to auscultation.

## 2019-07-16 NOTE — ANESTHESIA POSTPROCEDURE EVALUATION
[facility] Patient Status:  Hospital Outpatient Surgery   Age/Gender 27year old male MRN [id number]   Location [facility]. Attending Ken Silver MD   [facility] Day # 0 PCP Denny Michael.  Romel Carrillo MD       Anesthesia

## 2019-07-16 NOTE — OR NURSING
Ambulated to bathroom assisted by father, wife and this RN. Gait steady, patient reminded to walk slow. Instructed to call for assistance when finished. Stated understanding.

## 2019-07-31 PROCEDURE — 90960 ESRD SRV 4 VISITS P MO 20+: CPT | Performed by: INTERNAL MEDICINE

## 2019-08-31 PROCEDURE — 90960 ESRD SRV 4 VISITS P MO 20+: CPT | Performed by: INTERNAL MEDICINE

## 2019-09-30 PROCEDURE — 90960 ESRD SRV 4 VISITS P MO 20+: CPT | Performed by: INTERNAL MEDICINE

## 2019-10-31 PROCEDURE — 90960 ESRD SRV 4 VISITS P MO 20+: CPT | Performed by: INTERNAL MEDICINE

## 2019-11-30 PROCEDURE — 90960 ESRD SRV 4 VISITS P MO 20+: CPT | Performed by: INTERNAL MEDICINE

## 2019-12-01 ENCOUNTER — OFF PREMISE (OUTPATIENT)
Dept: ENDOCRINOLOGY | Age: 34
End: 2019-12-01

## 2019-12-01 DIAGNOSIS — Z99.2 END STAGE RENAL FAILURE ON DIALYSIS (CMD): ICD-10-CM

## 2019-12-01 DIAGNOSIS — N18.6 END STAGE RENAL FAILURE ON DIALYSIS (CMD): ICD-10-CM

## 2019-12-01 PROCEDURE — 90970 ESRD SVC PR DAY PT 20+: CPT | Performed by: INTERNAL MEDICINE

## 2019-12-02 PROCEDURE — 90970 ESRD SVC PR DAY PT 20+: CPT | Performed by: INTERNAL MEDICINE

## 2019-12-03 PROCEDURE — 90970 ESRD SVC PR DAY PT 20+: CPT | Performed by: INTERNAL MEDICINE

## 2019-12-04 PROCEDURE — 90970 ESRD SVC PR DAY PT 20+: CPT | Performed by: INTERNAL MEDICINE

## 2019-12-05 PROCEDURE — 90970 ESRD SVC PR DAY PT 20+: CPT | Performed by: INTERNAL MEDICINE

## 2019-12-06 PROCEDURE — 90970 ESRD SVC PR DAY PT 20+: CPT | Performed by: INTERNAL MEDICINE

## 2019-12-07 PROCEDURE — 90970 ESRD SVC PR DAY PT 20+: CPT | Performed by: INTERNAL MEDICINE

## 2019-12-08 PROCEDURE — 90970 ESRD SVC PR DAY PT 20+: CPT | Performed by: INTERNAL MEDICINE

## 2019-12-09 PROCEDURE — 90970 ESRD SVC PR DAY PT 20+: CPT | Performed by: INTERNAL MEDICINE

## 2020-01-31 PROCEDURE — 90960 ESRD SRV 4 VISITS P MO 20+: CPT | Performed by: INTERNAL MEDICINE

## 2020-02-29 PROCEDURE — 90960 ESRD SRV 4 VISITS P MO 20+: CPT | Performed by: INTERNAL MEDICINE

## 2020-03-31 PROCEDURE — 90960 ESRD SRV 4 VISITS P MO 20+: CPT | Performed by: INTERNAL MEDICINE

## 2020-04-30 PROCEDURE — 90960 ESRD SRV 4 VISITS P MO 20+: CPT | Performed by: INTERNAL MEDICINE

## 2020-05-31 PROCEDURE — 90960 ESRD SRV 4 VISITS P MO 20+: CPT | Performed by: INTERNAL MEDICINE

## 2020-06-30 PROCEDURE — 90960 ESRD SRV 4 VISITS P MO 20+: CPT | Performed by: INTERNAL MEDICINE

## 2020-07-31 PROCEDURE — 90960 ESRD SRV 4 VISITS P MO 20+: CPT | Performed by: INTERNAL MEDICINE

## 2020-08-31 PROCEDURE — 90960 ESRD SRV 4 VISITS P MO 20+: CPT | Performed by: INTERNAL MEDICINE

## 2020-09-30 PROCEDURE — 90960 ESRD SRV 4 VISITS P MO 20+: CPT | Performed by: INTERNAL MEDICINE

## 2020-10-31 PROCEDURE — 90960 ESRD SRV 4 VISITS P MO 20+: CPT | Performed by: INTERNAL MEDICINE

## 2020-11-30 PROCEDURE — 90960 ESRD SRV 4 VISITS P MO 20+: CPT | Performed by: INTERNAL MEDICINE

## 2020-12-31 PROCEDURE — 90960 ESRD SRV 4 VISITS P MO 20+: CPT | Performed by: INTERNAL MEDICINE

## 2021-01-31 PROCEDURE — 90960 ESRD SRV 4 VISITS P MO 20+: CPT | Performed by: INTERNAL MEDICINE

## 2021-02-28 PROCEDURE — 90966 ESRD HOME PT SERV P MO 20+: CPT | Performed by: INTERNAL MEDICINE

## 2021-03-31 PROCEDURE — 90966 ESRD HOME PT SERV P MO 20+: CPT | Performed by: INTERNAL MEDICINE

## 2021-04-30 PROCEDURE — 90966 ESRD HOME PT SERV P MO 20+: CPT | Performed by: INTERNAL MEDICINE

## 2021-05-15 ENCOUNTER — APPOINTMENT (OUTPATIENT)
Dept: GENERAL RADIOLOGY | Facility: HOSPITAL | Age: 36
End: 2021-05-15
Attending: EMERGENCY MEDICINE
Payer: MEDICARE

## 2021-05-15 ENCOUNTER — HOSPITAL ENCOUNTER (EMERGENCY)
Facility: HOSPITAL | Age: 36
Discharge: HOME | End: 2021-05-15
Attending: EMERGENCY MEDICINE
Payer: MEDICARE

## 2021-05-15 ENCOUNTER — APPOINTMENT (OUTPATIENT)
Dept: CT IMAGING | Facility: HOSPITAL | Age: 36
End: 2021-05-15
Attending: EMERGENCY MEDICINE
Payer: MEDICARE

## 2021-05-15 VITALS
HEART RATE: 73 BPM | RESPIRATION RATE: 17 BRPM | SYSTOLIC BLOOD PRESSURE: 130 MMHG | OXYGEN SATURATION: 96 % | DIASTOLIC BLOOD PRESSURE: 79 MMHG | TEMPERATURE: 98 F

## 2021-05-15 DIAGNOSIS — R10.9 ABDOMINAL PAIN OF UNKNOWN ETIOLOGY: ICD-10-CM

## 2021-05-15 DIAGNOSIS — R53.83 LETHARGY: Primary | ICD-10-CM

## 2021-05-15 PROCEDURE — 93010 ELECTROCARDIOGRAM REPORT: CPT

## 2021-05-15 PROCEDURE — 83605 ASSAY OF LACTIC ACID: CPT | Performed by: EMERGENCY MEDICINE

## 2021-05-15 PROCEDURE — 82140 ASSAY OF AMMONIA: CPT | Performed by: EMERGENCY MEDICINE

## 2021-05-15 PROCEDURE — 70450 CT HEAD/BRAIN W/O DYE: CPT | Performed by: EMERGENCY MEDICINE

## 2021-05-15 PROCEDURE — 99285 EMERGENCY DEPT VISIT HI MDM: CPT

## 2021-05-15 PROCEDURE — 36415 COLL VENOUS BLD VENIPUNCTURE: CPT

## 2021-05-15 PROCEDURE — 93005 ELECTROCARDIOGRAM TRACING: CPT

## 2021-05-15 PROCEDURE — 84145 PROCALCITONIN (PCT): CPT | Performed by: EMERGENCY MEDICINE

## 2021-05-15 PROCEDURE — 86140 C-REACTIVE PROTEIN: CPT | Performed by: EMERGENCY MEDICINE

## 2021-05-15 PROCEDURE — 80053 COMPREHEN METABOLIC PANEL: CPT | Performed by: EMERGENCY MEDICINE

## 2021-05-15 PROCEDURE — 85025 COMPLETE CBC W/AUTO DIFF WBC: CPT | Performed by: EMERGENCY MEDICINE

## 2021-05-15 PROCEDURE — 87040 BLOOD CULTURE FOR BACTERIA: CPT | Performed by: EMERGENCY MEDICINE

## 2021-05-15 PROCEDURE — 71045 X-RAY EXAM CHEST 1 VIEW: CPT | Performed by: EMERGENCY MEDICINE

## 2021-05-15 RX ORDER — DICYCLOMINE HCL 20 MG
20 TABLET ORAL 4 TIMES DAILY PRN
Qty: 30 TABLET | Refills: 0 | Status: SHIPPED | OUTPATIENT
Start: 2021-05-15 | End: 2021-06-14

## 2021-05-15 RX ORDER — DICYCLOMINE HCL 20 MG
20 TABLET ORAL ONCE
Status: COMPLETED | OUTPATIENT
Start: 2021-05-15 | End: 2021-05-15

## 2021-05-16 NOTE — ED INITIAL ASSESSMENT (HPI)
36YM c/c of weakness Pt reports since yesterday after diaylsis he been having chills, abd pain and trouble staying awake. Pt state that he finished his dialysis yesterday.  Pt repor s that he having blood in his stool

## 2021-05-16 NOTE — ED PROVIDER NOTES
Patient Seen in: [facility] Emergency Department      History   Patient presents with:  Fatigue    Stated Complaint: dialysis patient, AMS     HPI/Subjective:   HPI    14-year-old male is here with his sister for evaluation of generalized malaise an 2220 Oral   SpO2 05/15/21 2220 96 %   O2 Device 05/15/21 2220 None (Room air)       Current:BP (!) 151/96   Pulse 80   Temp 97.8 °F (36.6 °C) (Oral)   Resp 20   SpO2 96%         Physical Exam    General appearance:  This is a young adult male sitting on a g SARS-COV-2 BY PCR - Normal   CBC WITH DIFFERENTIAL WITH PLATELET    Narrative: The following orders were created for panel order CBC WITH DIFFERENTIAL WITH PLATELET.   Procedure                               Abnormality         Status (CPT=71045)  TECHNIQUE:  AP chest radiograph was obtained.   COMPARISON:  [facility] , XR, XR CHEST AP PORTABLE  (CPT=71045), 5/26/2018, 7:27 AM.  INDICATIONS:  dialysis patient, AMS  PATIENT STATED HISTORY: (As transcribed by Technologist)  Altered mental sta

## 2021-05-25 ENCOUNTER — NURSE TRIAGE (OUTPATIENT)
Dept: INTERNAL MEDICINE | Age: 36
End: 2021-05-25

## 2021-05-31 PROCEDURE — 90966 ESRD HOME PT SERV P MO 20+: CPT | Performed by: INTERNAL MEDICINE

## 2021-06-30 PROCEDURE — 90966 ESRD HOME PT SERV P MO 20+: CPT | Performed by: INTERNAL MEDICINE

## 2021-07-31 PROCEDURE — 90966 ESRD HOME PT SERV P MO 20+: CPT | Performed by: INTERNAL MEDICINE

## 2021-08-31 PROCEDURE — 90966 ESRD HOME PT SERV P MO 20+: CPT | Performed by: INTERNAL MEDICINE

## 2021-10-17 ENCOUNTER — HOSPITAL ENCOUNTER (EMERGENCY)
Facility: HOSPITAL | Age: 36
Discharge: HOME | End: 2021-10-17
Attending: EMERGENCY MEDICINE
Payer: MEDICARE

## 2021-10-17 VITALS
TEMPERATURE: 98 F | SYSTOLIC BLOOD PRESSURE: 127 MMHG | BODY MASS INDEX: 29.82 KG/M2 | HEART RATE: 77 BPM | WEIGHT: 190 LBS | OXYGEN SATURATION: 99 % | HEIGHT: 67 IN | RESPIRATION RATE: 16 BRPM | DIASTOLIC BLOOD PRESSURE: 80 MMHG

## 2021-10-17 DIAGNOSIS — D64.9 ANEMIA, UNSPECIFIED TYPE: ICD-10-CM

## 2021-10-17 DIAGNOSIS — R03.0 ELEVATED BLOOD PRESSURE READING: Primary | ICD-10-CM

## 2021-10-17 DIAGNOSIS — R73.9 HYPERGLYCEMIA: ICD-10-CM

## 2021-10-17 PROCEDURE — 93010 ELECTROCARDIOGRAM REPORT: CPT

## 2021-10-17 PROCEDURE — 96361 HYDRATE IV INFUSION ADD-ON: CPT

## 2021-10-17 PROCEDURE — 93005 ELECTROCARDIOGRAM TRACING: CPT

## 2021-10-17 PROCEDURE — 99284 EMERGENCY DEPT VISIT MOD MDM: CPT

## 2021-10-17 PROCEDURE — 80053 COMPREHEN METABOLIC PANEL: CPT | Performed by: EMERGENCY MEDICINE

## 2021-10-17 PROCEDURE — 85025 COMPLETE CBC W/AUTO DIFF WBC: CPT | Performed by: EMERGENCY MEDICINE

## 2021-10-17 PROCEDURE — 81001 URINALYSIS AUTO W/SCOPE: CPT | Performed by: EMERGENCY MEDICINE

## 2021-10-17 PROCEDURE — 96374 THER/PROPH/DIAG INJ IV PUSH: CPT

## 2021-10-17 RX ORDER — METOPROLOL SUCCINATE 25 MG/1
25 TABLET, EXTENDED RELEASE ORAL DAILY
Qty: 14 TABLET | Refills: 0 | Status: SHIPPED | OUTPATIENT
Start: 2021-10-17 | End: 2021-10-31

## 2021-10-17 RX ORDER — ENALAPRILAT 2.5 MG/2ML
1.25 INJECTION INTRAVENOUS ONCE
Status: COMPLETED | OUTPATIENT
Start: 2021-10-17 | End: 2021-10-17

## 2021-10-17 NOTE — ED INITIAL ASSESSMENT (HPI)
Patient is a kidney transplant patient and reports for one month he has been having difficulty with high blood pressure and a headache.

## 2021-10-17 NOTE — ED PROVIDER NOTES
Patient Seen in: [facility] Emergency Department      History   Patient presents with:  Hypertension    Stated Complaint: hypertension with a headache.     Subjective:   HPI    Nila Chun is a 65-year-old male presenting with a hypertension headach Ht 170.2 cm (5' 7\")   Wt 86.2 kg   SpO2 98%   BMI 29.76 kg/m²         Physical Exam  Alert and oriented patient appears no distress HEENT exam is normal lungs are clear cardiovascular exam shows a regular rhythm abdomen soft nontender extremities no calci stable here compared to his baseline of 2.7 creatinine.   Patient was to be started on a beta-blocker as an outpatient and is to contact his transplant specialist on Monday for reevaluation                             Disposition and Plan     Clinical Impre

## 2021-11-10 PROBLEM — E87.5 HYPERKALEMIA: Status: ACTIVE | Noted: 2019-02-23

## 2021-11-10 PROBLEM — I10 HTN (HYPERTENSION): Status: ACTIVE | Noted: 2021-09-08

## 2021-11-10 PROBLEM — E83.51 HYPOCALCEMIA: Status: ACTIVE | Noted: 2019-11-19

## 2021-11-10 PROBLEM — Z99.2 DEPENDENCE ON RENAL DIALYSIS (HCC): Status: ACTIVE | Noted: 2021-01-27

## 2021-11-10 PROBLEM — N18.9 ANEMIA IN CHRONIC KIDNEY DISEASE: Status: ACTIVE | Noted: 2019-02-18

## 2021-11-10 PROBLEM — E88.09 OTHER DISORDERS OF PLASMA-PROTEIN METABOLISM, NOT ELSEWHERE CLASSIFIED: Status: ACTIVE | Noted: 2019-02-11

## 2021-11-10 PROBLEM — N25.89 OTHER DISORDERS RESULTING FROM IMPAIRED RENAL TUBULAR FUNCTION: Status: ACTIVE | Noted: 2019-02-18

## 2021-11-10 PROBLEM — D50.9 IRON DEFICIENCY ANEMIA, UNSPECIFIED: Status: ACTIVE | Noted: 2019-02-18

## 2021-11-10 PROBLEM — E87.1 HYPO-OSMOLALITY AND HYPONATREMIA: Status: ACTIVE | Noted: 2019-02-18

## 2021-11-10 PROBLEM — E83.49 OTHER DISORDERS OF MAGNESIUM METABOLISM: Status: ACTIVE | Noted: 2019-02-18

## 2021-11-10 PROBLEM — I95.3 HYPOTENSION OF HEMODIALYSIS: Status: ACTIVE | Noted: 2021-01-27

## 2021-11-10 PROBLEM — R06.89 OTHER ABNORMALITIES OF BREATHING: Status: ACTIVE | Noted: 2019-02-18

## 2021-11-10 PROBLEM — D63.1 ANEMIA IN CHRONIC KIDNEY DISEASE: Status: ACTIVE | Noted: 2019-02-18

## 2021-11-10 PROBLEM — K76.89 OTHER SPECIFIED DISEASES OF LIVER: Status: ACTIVE | Noted: 2019-02-18

## 2021-11-10 PROBLEM — I12.0 HYPERTENSIVE CHRONIC KIDNEY DISEASE WITH STAGE 5 CHRONIC KIDNEY DISEASE OR END STAGE RENAL DISEASE (HCC): Status: ACTIVE | Noted: 2021-01-27

## 2021-11-10 PROBLEM — N25.81 SECONDARY HYPERPARATHYROIDISM OF RENAL ORIGIN (HCC): Status: ACTIVE | Noted: 2019-02-18

## 2021-11-10 PROBLEM — D68.9 COAGULATION DEFECT, UNSPECIFIED (HCC): Status: ACTIVE | Noted: 2019-02-18

## 2021-11-10 PROBLEM — N18.6 ESRD (END STAGE RENAL DISEASE) (HCC): Status: ACTIVE | Noted: 2019-02-18

## 2021-11-11 PROBLEM — K64.1 GRADE II HEMORRHOIDS: Status: ACTIVE | Noted: 2021-11-11

## 2023-01-19 ENCOUNTER — TELEPHONE (OUTPATIENT)
Dept: NEPHROLOGY | Age: 38
End: 2023-01-19

## 2023-01-24 ENCOUNTER — EXTERNAL RECORD (OUTPATIENT)
Dept: HEALTH INFORMATION MANAGEMENT | Facility: OTHER | Age: 38
End: 2023-01-24

## 2023-02-08 ENCOUNTER — OFFICE VISIT (OUTPATIENT)
Dept: NEPHROLOGY | Age: 38
End: 2023-02-08

## 2023-02-08 VITALS
HEIGHT: 67 IN | DIASTOLIC BLOOD PRESSURE: 88 MMHG | OXYGEN SATURATION: 96 % | SYSTOLIC BLOOD PRESSURE: 126 MMHG | RESPIRATION RATE: 18 BRPM | WEIGHT: 215 LBS | BODY MASS INDEX: 33.74 KG/M2 | HEART RATE: 70 BPM

## 2023-02-08 DIAGNOSIS — T86.11 CHRONIC RENAL ALLOGRAFT NEPHROPATHY: ICD-10-CM

## 2023-02-08 DIAGNOSIS — N18.6 ESRD (END STAGE RENAL DISEASE) ON DIALYSIS (CMD): Primary | ICD-10-CM

## 2023-02-08 DIAGNOSIS — R80.9 MICROALBUMINURIA: ICD-10-CM

## 2023-02-08 DIAGNOSIS — Z99.2 ESRD (END STAGE RENAL DISEASE) ON DIALYSIS (CMD): Primary | ICD-10-CM

## 2023-02-08 DIAGNOSIS — Z94.0 STATUS POST KIDNEY TRANSPLANT: ICD-10-CM

## 2023-02-08 DIAGNOSIS — R80.9 PROTEINURIA, UNSPECIFIED TYPE: ICD-10-CM

## 2023-02-08 DIAGNOSIS — E83.42 HYPOMAGNESEMIA: ICD-10-CM

## 2023-02-08 DIAGNOSIS — N18.31 STAGE 3A CHRONIC KIDNEY DISEASE (CMD): ICD-10-CM

## 2023-02-08 DIAGNOSIS — N25.81 SECONDARY HYPERPARATHYROIDISM (CMD): ICD-10-CM

## 2023-02-08 DIAGNOSIS — I10 ESSENTIAL HYPERTENSION: ICD-10-CM

## 2023-02-08 PROCEDURE — 99204 OFFICE O/P NEW MOD 45 MIN: CPT | Performed by: INTERNAL MEDICINE

## 2023-02-08 RX ORDER — CALCIUM CARBONATE/VITAMIN D3 500-10/5ML
LIQUID (ML) ORAL
COMMUNITY
End: 2023-03-13 | Stop reason: ALTCHOICE

## 2023-02-08 RX ORDER — SULFAMETHOXAZOLE AND TRIMETHOPRIM 800; 160 MG/1; MG/1
1 TABLET ORAL
Qty: 15 TABLET | Refills: 3 | Status: SHIPPED | OUTPATIENT
Start: 2023-02-09 | End: 2023-02-09 | Stop reason: SDUPTHER

## 2023-02-08 RX ORDER — LISINOPRIL 10 MG/1
10 TABLET ORAL DAILY
COMMUNITY
Start: 2022-10-24 | End: 2023-03-13 | Stop reason: SDUPTHER

## 2023-02-08 RX ORDER — ERGOCALCIFEROL 1.25 MG/1
CAPSULE ORAL
COMMUNITY

## 2023-02-08 RX ORDER — MYCOPHENOLIC ACID 360 MG/1
720 TABLET, DELAYED RELEASE ORAL 2 TIMES DAILY
Qty: 120 TABLET | Refills: 3 | Status: SHIPPED | OUTPATIENT
Start: 2023-02-08 | End: 2023-02-09 | Stop reason: SDUPTHER

## 2023-02-08 RX ORDER — CARVEDILOL 25 MG/1
25 TABLET ORAL
COMMUNITY
Start: 2022-10-21 | End: 2023-10-13 | Stop reason: SDUPTHER

## 2023-02-08 RX ORDER — CINACALCET 30 MG/1
30 TABLET, FILM COATED ORAL DAILY
Qty: 30 TABLET | Refills: 3 | Status: SHIPPED | OUTPATIENT
Start: 2023-02-08 | End: 2023-02-09 | Stop reason: SDUPTHER

## 2023-02-08 RX ORDER — SULFAMETHOXAZOLE AND TRIMETHOPRIM 800; 160 MG/1; MG/1
1 TABLET ORAL
COMMUNITY
Start: 2023-01-17 | End: 2023-02-08 | Stop reason: SDUPTHER

## 2023-02-08 RX ORDER — CINACALCET 30 MG/1
30 TABLET, FILM COATED ORAL
COMMUNITY
End: 2023-02-08 | Stop reason: ALTCHOICE

## 2023-02-09 ENCOUNTER — TELEPHONE (OUTPATIENT)
Dept: NEPHROLOGY | Age: 38
End: 2023-02-09

## 2023-02-09 RX ORDER — CINACALCET 30 MG/1
30 TABLET, FILM COATED ORAL DAILY
Qty: 30 TABLET | Refills: 3 | Status: SHIPPED | OUTPATIENT
Start: 2023-02-09 | End: 2023-03-13 | Stop reason: SDUPTHER

## 2023-02-09 RX ORDER — SULFAMETHOXAZOLE AND TRIMETHOPRIM 800; 160 MG/1; MG/1
1 TABLET ORAL
Qty: 15 TABLET | Refills: 3 | Status: SHIPPED | OUTPATIENT
Start: 2023-02-09

## 2023-02-09 RX ORDER — MYCOPHENOLIC ACID 360 MG/1
720 TABLET, DELAYED RELEASE ORAL 2 TIMES DAILY
Qty: 120 TABLET | Refills: 3 | Status: SHIPPED | OUTPATIENT
Start: 2023-02-09

## 2023-02-15 ENCOUNTER — TELEPHONE (OUTPATIENT)
Dept: NEPHROLOGY | Age: 38
End: 2023-02-15

## 2023-03-13 ENCOUNTER — WALK IN (OUTPATIENT)
Dept: URGENT CARE | Age: 38
End: 2023-03-13

## 2023-03-13 VITALS
HEART RATE: 64 BPM | TEMPERATURE: 97.1 F | SYSTOLIC BLOOD PRESSURE: 128 MMHG | OXYGEN SATURATION: 98 % | RESPIRATION RATE: 16 BRPM | DIASTOLIC BLOOD PRESSURE: 90 MMHG

## 2023-03-13 DIAGNOSIS — Z76.89 ENCOUNTER TO ESTABLISH CARE: ICD-10-CM

## 2023-03-13 DIAGNOSIS — M54.9 ACUTE BACK PAIN, UNSPECIFIED BACK LOCATION, UNSPECIFIED BACK PAIN LATERALITY: Primary | ICD-10-CM

## 2023-03-13 PROCEDURE — 99204 OFFICE O/P NEW MOD 45 MIN: CPT | Performed by: FAMILY MEDICINE

## 2023-03-13 RX ORDER — SEVELAMER CARBONATE 800 MG/1
3 TABLET, FILM COATED ORAL
COMMUNITY
Start: 2021-03-07 | End: 2023-03-13 | Stop reason: ALTCHOICE

## 2023-03-13 RX ORDER — LIDOCAINE AND PRILOCAINE 25; 25 MG/G; MG/G
CREAM TOPICAL
COMMUNITY
Start: 2018-10-07 | End: 2023-03-13 | Stop reason: ALTCHOICE

## 2023-03-13 RX ORDER — LANOLIN ALCOHOL/MO/W.PET/CERES
800 CREAM (GRAM) TOPICAL
COMMUNITY
Start: 2022-11-02 | End: 2023-10-13 | Stop reason: SDUPTHER

## 2023-03-13 RX ORDER — LISINOPRIL 10 MG/1
20 TABLET ORAL DAILY
COMMUNITY
Start: 2023-02-13 | End: 2024-02-13

## 2023-03-13 RX ORDER — TRAMADOL HYDROCHLORIDE 50 MG/1
50 TABLET ORAL EVERY 6 HOURS PRN
Qty: 20 TABLET | Refills: 0 | Status: SHIPPED | OUTPATIENT
Start: 2023-03-13 | End: 2023-03-17

## 2023-03-13 RX ORDER — AMLODIPINE BESYLATE 5 MG/1
5 TABLET ORAL
COMMUNITY
Start: 2018-06-25 | End: 2023-03-13 | Stop reason: ALTCHOICE

## 2023-03-13 RX ORDER — CALCITRIOL 0.5 UG/1
1 CAPSULE, LIQUID FILLED ORAL DAILY
COMMUNITY
Start: 2021-04-21 | End: 2023-03-13 | Stop reason: ALTCHOICE

## 2023-03-13 RX ORDER — VIT B COMP NO.3/FOLIC/C/BIOTIN 1 MG-60 MG
1 TABLET ORAL DAILY
COMMUNITY
Start: 2020-12-04

## 2023-03-13 RX ORDER — CINACALCET 30 MG/1
1 TABLET, FILM COATED ORAL DAILY
COMMUNITY
Start: 2023-02-13 | End: 2023-10-13 | Stop reason: SDUPTHER

## 2023-07-19 ENCOUNTER — WALK IN (OUTPATIENT)
Dept: URGENT CARE | Age: 38
End: 2023-07-19

## 2023-07-19 VITALS
HEART RATE: 86 BPM | OXYGEN SATURATION: 96 % | SYSTOLIC BLOOD PRESSURE: 144 MMHG | DIASTOLIC BLOOD PRESSURE: 82 MMHG | RESPIRATION RATE: 20 BRPM | TEMPERATURE: 97.8 F

## 2023-07-19 DIAGNOSIS — J06.9 URI, ACUTE: Primary | ICD-10-CM

## 2023-07-19 DIAGNOSIS — Z20.822 SUSPECTED COVID-19 VIRUS INFECTION: ICD-10-CM

## 2023-07-19 LAB
INTERNAL PROCEDURAL CONTROLS ACCEPTABLE: YES
SARS-COV+SARS-COV-2 AG RESP QL IA.RAPID: NOT DETECTED
TEST LOT EXPIRATION DATE: NORMAL
TEST LOT NUMBER: NORMAL

## 2023-07-19 PROCEDURE — 87426 SARSCOV CORONAVIRUS AG IA: CPT | Performed by: FAMILY MEDICINE

## 2023-07-19 PROCEDURE — 99214 OFFICE O/P EST MOD 30 MIN: CPT | Performed by: FAMILY MEDICINE

## 2023-07-19 RX ORDER — DEXTROMETHORPHAN HYDROBROMIDE AND PROMETHAZINE HYDROCHLORIDE 15; 6.25 MG/5ML; MG/5ML
5 SYRUP ORAL 4 TIMES DAILY PRN
Qty: 120 ML | Refills: 0 | Status: SHIPPED | OUTPATIENT
Start: 2023-07-19 | End: 2023-07-29

## 2023-10-13 ENCOUNTER — TELEPHONE (OUTPATIENT)
Dept: NEPHROLOGY | Age: 38
End: 2023-10-13

## 2023-10-13 ENCOUNTER — OFFICE VISIT (OUTPATIENT)
Dept: NEPHROLOGY | Age: 38
End: 2023-10-13

## 2023-10-13 VITALS
BODY MASS INDEX: 33.3 KG/M2 | WEIGHT: 212.6 LBS | OXYGEN SATURATION: 99 % | RESPIRATION RATE: 18 BRPM | SYSTOLIC BLOOD PRESSURE: 137 MMHG | DIASTOLIC BLOOD PRESSURE: 68 MMHG | HEART RATE: 72 BPM

## 2023-10-13 DIAGNOSIS — Z99.2 ESRD (END STAGE RENAL DISEASE) ON DIALYSIS (CMD): Primary | ICD-10-CM

## 2023-10-13 DIAGNOSIS — Z94.0 STATUS POST KIDNEY TRANSPLANT: ICD-10-CM

## 2023-10-13 DIAGNOSIS — T86.11 CHRONIC RENAL ALLOGRAFT NEPHROPATHY: ICD-10-CM

## 2023-10-13 DIAGNOSIS — N18.6 ESRD (END STAGE RENAL DISEASE) ON DIALYSIS (CMD): Primary | ICD-10-CM

## 2023-10-13 DIAGNOSIS — N25.81 SECONDARY HYPERPARATHYROIDISM (CMD): ICD-10-CM

## 2023-10-13 DIAGNOSIS — N18.31 STAGE 3A CHRONIC KIDNEY DISEASE (CMD): ICD-10-CM

## 2023-10-13 DIAGNOSIS — R80.9 MICROALBUMINURIA: ICD-10-CM

## 2023-10-13 DIAGNOSIS — E83.42 HYPOMAGNESEMIA: ICD-10-CM

## 2023-10-13 DIAGNOSIS — R80.9 PROTEINURIA, UNSPECIFIED TYPE: ICD-10-CM

## 2023-10-13 DIAGNOSIS — I10 ESSENTIAL HYPERTENSION: ICD-10-CM

## 2023-10-13 PROCEDURE — 99215 OFFICE O/P EST HI 40 MIN: CPT | Performed by: INTERNAL MEDICINE

## 2023-10-13 RX ORDER — SODIUM BICARBONATE 650 MG/1
650 TABLET ORAL
COMMUNITY
Start: 2023-06-12 | End: 2023-10-13 | Stop reason: ALTCHOICE

## 2023-10-13 RX ORDER — LANOLIN ALCOHOL/MO/W.PET/CERES
800 CREAM (GRAM) TOPICAL 2 TIMES DAILY
Qty: 120 TABLET | Refills: 3 | Status: SHIPPED | OUTPATIENT
Start: 2023-10-13

## 2023-10-13 RX ORDER — CINACALCET 30 MG/1
60 TABLET, FILM COATED ORAL DAILY
Qty: 60 TABLET | Refills: 3 | Status: SHIPPED | OUTPATIENT
Start: 2023-10-13

## 2023-10-13 RX ORDER — CARVEDILOL 25 MG/1
25 TABLET ORAL 2 TIMES DAILY WITH MEALS
Qty: 60 TABLET | Refills: 5 | Status: SHIPPED | OUTPATIENT
Start: 2023-10-13

## 2023-10-17 ENCOUNTER — TELEPHONE (OUTPATIENT)
Dept: NEPHROLOGY | Age: 38
End: 2023-10-17

## 2023-10-27 ENCOUNTER — LAB SERVICES (OUTPATIENT)
Dept: LAB | Age: 38
End: 2023-10-27

## 2023-10-27 DIAGNOSIS — R80.9 MICROALBUMINURIA: ICD-10-CM

## 2023-10-27 DIAGNOSIS — N18.31 STAGE 3A CHRONIC KIDNEY DISEASE (CMD): ICD-10-CM

## 2023-10-27 DIAGNOSIS — I10 ESSENTIAL HYPERTENSION: ICD-10-CM

## 2023-10-27 DIAGNOSIS — T86.11 CHRONIC RENAL ALLOGRAFT NEPHROPATHY: ICD-10-CM

## 2023-10-27 DIAGNOSIS — R80.9 PROTEINURIA, UNSPECIFIED TYPE: ICD-10-CM

## 2023-10-27 DIAGNOSIS — Z99.2 ESRD (END STAGE RENAL DISEASE) ON DIALYSIS (CMD): ICD-10-CM

## 2023-10-27 DIAGNOSIS — N25.81 SECONDARY HYPERPARATHYROIDISM (CMD): ICD-10-CM

## 2023-10-27 DIAGNOSIS — Z94.0 STATUS POST KIDNEY TRANSPLANT: ICD-10-CM

## 2023-10-27 DIAGNOSIS — E83.42 HYPOMAGNESEMIA: ICD-10-CM

## 2023-10-27 DIAGNOSIS — N18.6 ESRD (END STAGE RENAL DISEASE) ON DIALYSIS (CMD): ICD-10-CM

## 2023-10-27 LAB
ANION GAP SERPL CALC-SCNC: 16 MMOL/L (ref 7–19)
BUN SERPL-MCNC: 30 MG/DL (ref 6–20)
BUN/CREAT SERPL: 18 (ref 7–25)
CALCIUM SERPL-MCNC: 9 MG/DL (ref 8.4–10.2)
CHLORIDE SERPL-SCNC: 104 MMOL/L (ref 97–110)
CO2 SERPL-SCNC: 24 MMOL/L (ref 21–32)
CREAT SERPL-MCNC: 1.68 MG/DL (ref 0.67–1.17)
EGFRCR SERPLBLD CKD-EPI 2021: 53 ML/MIN/{1.73_M2}
FASTING DURATION TIME PATIENT: 12 HOURS (ref 0–999)
GLUCOSE SERPL-MCNC: 93 MG/DL (ref 70–99)
POTASSIUM SERPL-SCNC: 4.8 MMOL/L (ref 3.4–5.1)
SODIUM SERPL-SCNC: 139 MMOL/L (ref 135–145)
TACROLIMUS BLD-MCNC: 7.3 NG/ML (ref 5–20)

## 2023-10-27 PROCEDURE — 80197 ASSAY OF TACROLIMUS: CPT | Performed by: CLINICAL MEDICAL LABORATORY

## 2023-10-27 PROCEDURE — 80048 BASIC METABOLIC PNL TOTAL CA: CPT | Performed by: INTERNAL MEDICINE

## 2023-10-27 PROCEDURE — 36415 COLL VENOUS BLD VENIPUNCTURE: CPT | Performed by: INTERNAL MEDICINE

## 2023-11-01 DIAGNOSIS — Z94.0 STATUS POST KIDNEY TRANSPLANT: Primary | ICD-10-CM

## 2023-11-01 DIAGNOSIS — Z79.899 ENCOUNTER FOR LONG-TERM (CURRENT) USE OF HIGH-RISK MEDICATION: ICD-10-CM

## 2023-11-01 DIAGNOSIS — T86.11 CHRONIC RENAL ALLOGRAFT NEPHROPATHY: ICD-10-CM

## 2023-11-22 ENCOUNTER — LAB SERVICES (OUTPATIENT)
Dept: LAB | Age: 38
End: 2023-11-22

## 2023-11-22 DIAGNOSIS — Z94.0 STATUS POST KIDNEY TRANSPLANT: ICD-10-CM

## 2023-11-22 DIAGNOSIS — T86.11 CHRONIC RENAL ALLOGRAFT NEPHROPATHY: ICD-10-CM

## 2023-11-22 DIAGNOSIS — Z79.899 ENCOUNTER FOR LONG-TERM (CURRENT) USE OF HIGH-RISK MEDICATION: ICD-10-CM

## 2023-11-22 LAB — TACROLIMUS BLD-MCNC: 4.1 NG/ML (ref 5–20)

## 2023-11-22 PROCEDURE — 80197 ASSAY OF TACROLIMUS: CPT | Performed by: CLINICAL MEDICAL LABORATORY

## 2023-11-22 PROCEDURE — 36415 COLL VENOUS BLD VENIPUNCTURE: CPT | Performed by: INTERNAL MEDICINE

## 2023-11-24 DIAGNOSIS — Z79.899 ENCOUNTER FOR LONG-TERM (CURRENT) USE OF HIGH-RISK MEDICATION: ICD-10-CM

## 2023-11-24 DIAGNOSIS — Z94.0 STATUS POST KIDNEY TRANSPLANT: Primary | ICD-10-CM

## 2023-11-24 DIAGNOSIS — T86.11 CHRONIC RENAL ALLOGRAFT NEPHROPATHY: ICD-10-CM

## 2023-12-08 ENCOUNTER — LAB SERVICES (OUTPATIENT)
Dept: LAB | Age: 38
End: 2023-12-08

## 2023-12-08 DIAGNOSIS — Z94.0 STATUS POST KIDNEY TRANSPLANT: ICD-10-CM

## 2023-12-08 DIAGNOSIS — Z79.899 ENCOUNTER FOR LONG-TERM (CURRENT) USE OF HIGH-RISK MEDICATION: ICD-10-CM

## 2023-12-08 DIAGNOSIS — T86.11 CHRONIC RENAL ALLOGRAFT NEPHROPATHY: ICD-10-CM

## 2023-12-08 LAB — TACROLIMUS BLD-MCNC: 9.8 NG/ML (ref 5–20)

## 2023-12-08 PROCEDURE — 36415 COLL VENOUS BLD VENIPUNCTURE: CPT | Performed by: INTERNAL MEDICINE

## 2023-12-08 PROCEDURE — 80197 ASSAY OF TACROLIMUS: CPT | Performed by: CLINICAL MEDICAL LABORATORY

## 2023-12-19 DIAGNOSIS — N18.31 STAGE 3A CHRONIC KIDNEY DISEASE (CMD): ICD-10-CM

## 2023-12-19 DIAGNOSIS — Z94.0 STATUS POST KIDNEY TRANSPLANT: Primary | ICD-10-CM

## 2023-12-19 DIAGNOSIS — I10 ESSENTIAL HYPERTENSION: ICD-10-CM

## 2023-12-19 DIAGNOSIS — Z79.899 ENCOUNTER FOR LONG-TERM (CURRENT) USE OF HIGH-RISK MEDICATION: ICD-10-CM

## 2023-12-19 DIAGNOSIS — T86.11 CHRONIC RENAL ALLOGRAFT NEPHROPATHY: ICD-10-CM

## 2024-01-17 ENCOUNTER — LAB SERVICES (OUTPATIENT)
Dept: LAB | Age: 39
End: 2024-01-17

## 2024-01-17 ENCOUNTER — TELEPHONE (OUTPATIENT)
Dept: NEPHROLOGY | Age: 39
End: 2024-01-17

## 2024-01-17 ENCOUNTER — OFFICE VISIT (OUTPATIENT)
Dept: NEPHROLOGY | Age: 39
End: 2024-01-17

## 2024-01-17 VITALS
WEIGHT: 208 LBS | DIASTOLIC BLOOD PRESSURE: 70 MMHG | BODY MASS INDEX: 32.58 KG/M2 | HEART RATE: 76 BPM | OXYGEN SATURATION: 98 % | SYSTOLIC BLOOD PRESSURE: 134 MMHG

## 2024-01-17 DIAGNOSIS — Z99.2 ESRD (END STAGE RENAL DISEASE) ON DIALYSIS (CMD): ICD-10-CM

## 2024-01-17 DIAGNOSIS — N18.31 STAGE 3A CHRONIC KIDNEY DISEASE (CMD): ICD-10-CM

## 2024-01-17 DIAGNOSIS — R80.9 MICROALBUMINURIA: ICD-10-CM

## 2024-01-17 DIAGNOSIS — E83.42 HYPOMAGNESEMIA: ICD-10-CM

## 2024-01-17 DIAGNOSIS — Z79.899 ENCOUNTER FOR LONG-TERM (CURRENT) USE OF HIGH-RISK MEDICATION: ICD-10-CM

## 2024-01-17 DIAGNOSIS — N25.81 SECONDARY HYPERPARATHYROIDISM (CMD): ICD-10-CM

## 2024-01-17 DIAGNOSIS — N18.6 ESRD (END STAGE RENAL DISEASE) ON DIALYSIS (CMD): ICD-10-CM

## 2024-01-17 DIAGNOSIS — I10 ESSENTIAL HYPERTENSION: ICD-10-CM

## 2024-01-17 DIAGNOSIS — T86.11 CHRONIC RENAL ALLOGRAFT NEPHROPATHY: ICD-10-CM

## 2024-01-17 DIAGNOSIS — Z94.0 STATUS POST KIDNEY TRANSPLANT: Primary | ICD-10-CM

## 2024-01-17 DIAGNOSIS — R80.9 PROTEINURIA, UNSPECIFIED TYPE: ICD-10-CM

## 2024-01-17 DIAGNOSIS — Z94.0 STATUS POST KIDNEY TRANSPLANT: ICD-10-CM

## 2024-01-17 DIAGNOSIS — N17.9 AKI (ACUTE KIDNEY INJURY) (CMD): ICD-10-CM

## 2024-01-17 LAB
25(OH)D3+25(OH)D2 SERPL-MCNC: 54.4 NG/ML (ref 30–100)
ANION GAP SERPL CALC-SCNC: 14 MMOL/L (ref 7–19)
APPEARANCE UR: CLEAR
BILIRUB UR QL STRIP: NEGATIVE
BUN SERPL-MCNC: 29 MG/DL (ref 6–20)
BUN/CREAT SERPL: 15 (ref 7–25)
CALCIUM SERPL-MCNC: 9.3 MG/DL (ref 8.4–10.2)
CHLORIDE SERPL-SCNC: 104 MMOL/L (ref 97–110)
CO2 SERPL-SCNC: 25 MMOL/L (ref 21–32)
COLOR UR: COLORLESS
CREAT SERPL-MCNC: 1.9 MG/DL (ref 0.67–1.17)
CREAT UR-MCNC: 71.84 MG/DL
CREAT UR-MCNC: 72.04 MG/DL
EGFRCR SERPLBLD CKD-EPI 2021: 45 ML/MIN/{1.73_M2}
FASTING DURATION TIME PATIENT: ABNORMAL H
GLUCOSE SERPL-MCNC: 100 MG/DL (ref 70–99)
GLUCOSE UR STRIP-MCNC: NEGATIVE MG/DL
HGB BLD-MCNC: 16.2 G/DL (ref 13–17)
HGB UR QL STRIP: NEGATIVE
KETONES UR STRIP-MCNC: NEGATIVE MG/DL
LEUKOCYTE ESTERASE UR QL STRIP: NEGATIVE
MAGNESIUM SERPL-MCNC: 2 MG/DL (ref 1.7–2.4)
MICROALBUMIN UR-MCNC: 5.84 MG/DL
MICROALBUMIN/CREAT UR: 81.3 MG/G
NITRITE UR QL STRIP: NEGATIVE
PH UR STRIP: 6.5 [PH] (ref 5–7)
PHOSPHATE SERPL-MCNC: 4.5 MG/DL (ref 2.4–4.7)
POTASSIUM SERPL-SCNC: 4.8 MMOL/L (ref 3.4–5.1)
PROT UR STRIP-MCNC: ABNORMAL MG/DL
PROT UR-MCNC: 23 MG/DL
PROT/CREAT UR: 319 MGPR/GCR
SODIUM SERPL-SCNC: 138 MMOL/L (ref 135–145)
SP GR UR STRIP: 1.01 (ref 1–1.03)
TACROLIMUS BLD-MCNC: 8.4 NG/ML (ref 5–20)
UROBILINOGEN UR STRIP-MCNC: 0.2 MG/DL

## 2024-01-17 PROCEDURE — 80197 ASSAY OF TACROLIMUS: CPT | Performed by: CLINICAL MEDICAL LABORATORY

## 2024-01-17 PROCEDURE — 82043 UR ALBUMIN QUANTITATIVE: CPT | Performed by: INTERNAL MEDICINE

## 2024-01-17 PROCEDURE — 83735 ASSAY OF MAGNESIUM: CPT | Performed by: INTERNAL MEDICINE

## 2024-01-17 PROCEDURE — 81003 URINALYSIS AUTO W/O SCOPE: CPT | Performed by: INTERNAL MEDICINE

## 2024-01-17 PROCEDURE — 82570 ASSAY OF URINE CREATININE: CPT | Performed by: INTERNAL MEDICINE

## 2024-01-17 PROCEDURE — 99215 OFFICE O/P EST HI 40 MIN: CPT | Performed by: INTERNAL MEDICINE

## 2024-01-17 PROCEDURE — 82306 VITAMIN D 25 HYDROXY: CPT | Performed by: INTERNAL MEDICINE

## 2024-01-17 PROCEDURE — 84100 ASSAY OF PHOSPHORUS: CPT | Performed by: INTERNAL MEDICINE

## 2024-01-17 PROCEDURE — 80048 BASIC METABOLIC PNL TOTAL CA: CPT | Performed by: INTERNAL MEDICINE

## 2024-01-17 PROCEDURE — 84156 ASSAY OF PROTEIN URINE: CPT | Performed by: INTERNAL MEDICINE

## 2024-01-17 PROCEDURE — 85018 HEMOGLOBIN: CPT | Performed by: INTERNAL MEDICINE

## 2024-01-17 PROCEDURE — 36415 COLL VENOUS BLD VENIPUNCTURE: CPT | Performed by: INTERNAL MEDICINE

## 2024-01-17 PROCEDURE — 83970 ASSAY OF PARATHORMONE: CPT | Performed by: CLINICAL MEDICAL LABORATORY

## 2024-01-17 RX ORDER — LISINOPRIL 20 MG/1
20 TABLET ORAL 2 TIMES DAILY
Qty: 60 TABLET | Refills: 5 | Status: SHIPPED | OUTPATIENT
Start: 2024-01-17 | End: 2024-01-17 | Stop reason: DRUGHIGH

## 2024-01-17 RX ORDER — LISINOPRIL 20 MG/1
TABLET ORAL
Qty: 60 TABLET | Refills: 5 | Status: SHIPPED | COMMUNITY
Start: 2024-01-17

## 2024-01-18 LAB — PTH-INTACT SERPL-MCNC: 173 PG/ML (ref 19–88)

## (undated) DEVICE — SNARE CAPTIFLEX MICRO-OVL OLY

## (undated) DEVICE — FORCEP BIOPSY RJ4 LG CAP W/ND

## (undated) DEVICE — FLUIDGARD® 160 ANTI-FOG SURGICAL MASK WITH ANTI-GLARE SHIELD: Brand: PRECEPT ®

## (undated) DEVICE — ENDOSCOPY PACK UPPER: Brand: MEDLINE INDUSTRIES, INC.

## (undated) DEVICE — FILTERLINE NASAL ADULT O2/CO2

## (undated) DEVICE — 1200CC GUARDIAN II: Brand: GUARDIAN

## (undated) DEVICE — SPEEDBAND SUPERVIEW SUPER 7

## (undated) DEVICE — ENDOSCOPY PACK - LOWER: Brand: MEDLINE INDUSTRIES, INC.

## (undated) DEVICE — TRAP 4 CPTR CHMBR N EZ INLN

## (undated) DEVICE — 3M™ RED DOT™ MONITORING ELECTRODE WITH FOAM TAPE AND STICKY GEL, 50/BAG, 20/CASE, 72/PLT 2570: Brand: RED DOT™

## (undated) DEVICE — Device: Brand: DEFENDO AIR/WATER/SUCTION AND BIOPSY VALVE

## (undated) NOTE — LETTER
[facility]  Radha Ford 60 [address]    Consent for Operation    Date: __________________    Time: _______________    1.  I authorize the performance upon [facility] the following operation:    Esophagogastroduodenoscopy an procedure has been videotaped, the surgeon will obtain the original videotape. The hospital will not be responsible for storage or maintenance of this tape.     6. For the purpose of advancing medical education, I consent to the admittance of observers to t STATEMENTS REQUIRING INSERTION OR COMPLETION WERE FILLED IN.     Signature of Patient:   ___________________________    When the patient is a minor or mentally incompetent to give consent:  Signature of person authorized to consent for patient: ____________ supplements, and pills I can buy without a prescription (including street drugs/illegal medications). Failure to inform my anesthesiologist about these medicines may increase my risk of anesthetic complications.   · If I am allergic to anything or have had Anesthesiologist Signature     Date   Time  I have discussed the procedure and information above with the patient (or patient’s representative) and answered their questions. The patient or their representative has agreed to have anesthesia services.     ___

## (undated) NOTE — LETTER
Consent to Procedure/Sedation    Date: 5/26/2018    Time: 1001    1. I authorize the performance upon [facility] the following:  Temporary Dialysis Catheter Insertion      2.  I authorize Dr. Jones Almendarez (and whomever is designated as the doctor’s a Witness: ____________________     Date: ______________    Printed: 2018   10:01 AM    Patient Name: Juancarlos De La Garza        : 4/15/1985       Medical Record #: [id number]

## (undated) NOTE — LETTER
Consent to Procedure/Sedation    Date: 5/30/2018    Time: _______________    1. I authorize the performance upon Rosalinda Garnica the following:    Permanent Dialysis Catheter Insertion    2.  I authorize Dr. Donell Pino (and whomever is designated as the d ___________________________    ___________________    Witness: ____________________     Date: ______________    Printed: 2018   6:51 AM    Patient Name: Hermilo Grajeda        : 4/15/1985       Medical Record #: [id number]

## (undated) NOTE — LETTER
Date: 2019  Patient Name:  Rosalinda Garnica             Address: [address] [facility] [id number]    : 4/15/1985    Dear Rosalinda Garnica,    I hope this letter finds you doing well.   I am writing to inform you of the following:

## (undated) NOTE — LETTER
Date: 6/3/2018  Patient Name:  Bibiana Sanchezalem             Address: [address] [place] [address]    : 4/15/1985    Dear Bibiana Evans,    Your gastric biopsies were positive for a bacteria called H Pylori.  This can cause ulcers and even c